# Patient Record
Sex: MALE | URBAN - METROPOLITAN AREA
[De-identification: names, ages, dates, MRNs, and addresses within clinical notes are randomized per-mention and may not be internally consistent; named-entity substitution may affect disease eponyms.]

---

## 2024-02-08 ENCOUNTER — NON-PROVIDER VISIT (OUTPATIENT)
Dept: URGENT CARE | Facility: CLINIC | Age: 29
End: 2024-02-08

## 2024-02-08 DIAGNOSIS — Z02.1 PRE-EMPLOYMENT DRUG SCREENING: ICD-10-CM

## 2024-02-08 LAB
AMP AMPHETAMINE: NORMAL
COC COCAINE: NORMAL
INT CON NEG: NORMAL
INT CON POS: NORMAL
MET METHAMPHETAMINES: NORMAL
OPI OPIATES: NORMAL
PCP PHENCYCLIDINE: NORMAL
POC DRUG COMMENT 753798-OCCUPATIONAL HEALTH: NEGATIVE
THC: NORMAL

## 2024-02-08 PROCEDURE — 80305 DRUG TEST PRSMV DIR OPT OBS: CPT | Performed by: NURSE PRACTITIONER

## 2024-04-20 ENCOUNTER — APPOINTMENT (OUTPATIENT)
Dept: URGENT CARE | Facility: CLINIC | Age: 29
End: 2024-04-20
Payer: COMMERCIAL

## 2024-04-21 ENCOUNTER — OCCUPATIONAL MEDICINE (OUTPATIENT)
Dept: URGENT CARE | Facility: CLINIC | Age: 29
End: 2024-04-21
Payer: COMMERCIAL

## 2024-04-21 ENCOUNTER — APPOINTMENT (OUTPATIENT)
Dept: RADIOLOGY | Facility: IMAGING CENTER | Age: 29
End: 2024-04-21
Payer: COMMERCIAL

## 2024-04-21 ENCOUNTER — NON-PROVIDER VISIT (OUTPATIENT)
Dept: URGENT CARE | Facility: CLINIC | Age: 29
End: 2024-04-21
Payer: COMMERCIAL

## 2024-04-21 VITALS
RESPIRATION RATE: 20 BRPM | HEIGHT: 71 IN | WEIGHT: 195 LBS | DIASTOLIC BLOOD PRESSURE: 82 MMHG | HEART RATE: 71 BPM | OXYGEN SATURATION: 99 % | SYSTOLIC BLOOD PRESSURE: 118 MMHG | TEMPERATURE: 97.2 F | BODY MASS INDEX: 27.3 KG/M2

## 2024-04-21 VITALS
BODY MASS INDEX: 27.3 KG/M2 | DIASTOLIC BLOOD PRESSURE: 82 MMHG | OXYGEN SATURATION: 99 % | SYSTOLIC BLOOD PRESSURE: 118 MMHG | WEIGHT: 195 LBS | TEMPERATURE: 97.6 F | RESPIRATION RATE: 20 BRPM | HEIGHT: 71 IN | HEART RATE: 71 BPM
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
## 2024-04-21 DIAGNOSIS — M25.531 RIGHT WRIST PAIN: ICD-10-CM

## 2024-04-21 DIAGNOSIS — Z02.1 PRE-EMPLOYMENT DRUG SCREENING: ICD-10-CM

## 2024-04-21 LAB
AMP AMPHETAMINE: NORMAL
BREATH ALCOHOL COMMENT: NORMAL
COC COCAINE: NORMAL
INT CON NEG: NORMAL
INT CON POS: NORMAL
MET METHAMPHETAMINES: NORMAL
OPI OPIATES: NORMAL
PCP PHENCYCLIDINE: NORMAL
POC BREATHALIZER: 0 PERCENT (ref 0–0.01)
POC DRUG COMMENT 753798-OCCUPATIONAL HEALTH: NEGATIVE
THC: NORMAL

## 2024-04-21 PROCEDURE — 82075 ASSAY OF BREATH ETHANOL: CPT

## 2024-04-21 PROCEDURE — 3079F DIAST BP 80-89 MM HG: CPT

## 2024-04-21 PROCEDURE — 80305 DRUG TEST PRSMV DIR OPT OBS: CPT

## 2024-04-21 PROCEDURE — 3074F SYST BP LT 130 MM HG: CPT

## 2024-04-21 PROCEDURE — 99203 OFFICE O/P NEW LOW 30 MIN: CPT

## 2024-04-21 PROCEDURE — 73110 X-RAY EXAM OF WRIST: CPT | Mod: TC,RT | Performed by: RADIOLOGY

## 2024-04-21 NOTE — LETTER
"    EMPLOYEE’S CLAIM FOR COMPENSATION/ REPORT OF INITIAL TREATMENT  FORM C-4  PLEASE TYPE OR PRINT    EMPLOYEE’S CLAIM - PROVIDE ALL INFORMATION REQUESTED   First Name                    QUAN Sage Last Name  Kirt Birthdate                    1995                Sex  []M  []F Claim Number (Insurer’s Use Only)     Home Address  [address] Age  28 y.o. Height  1.803 m (5' 11\") Weight  88.5 kg (195 lb) Social Security Number     [facility] Zip  [address] Telephone  [phone] (home)    Mailing Address  [address] [facility] Zip  [address] Primary Language Spoken  English    INSURER   THIRD-PARTY   [employer]   Employee's Occupation (Job Title) When Injury or Occupational Disease Occurred      Employer's Name/Company Name  [employer]  Telephone  [phone]    Office Mail Address (Number and Street)  [address]     Date of Injury (if applicable) 4/20/2024               Hours Injury (if applicable)  3:09 PM Date Employer Notified  4/20/2024 Last Day of Work after Injury or Occupational Disease  4/20/2024 Supervisor to Whom Injury     Reported  Jemima Castrejon   Address or Location of Accident (if applicable)  Work [1]   What were you doing at the time of accident? (if applicable)  opening a side door    How did this injury or occupational disease occur? (Be specific and answer in detail. Use additional sheet if necessary)  While opening a door I felt a pop & my right wrist is now no good   If you believe that you have an occupational disease, when did you first have knowledge of the disability and its relationship to your employment?  NA Witnesses to the Accident (if applicable)  None      Nature of Injury or Occupational Disease  Sprain  Part(s) of Body Injured or Affected  Wrist (R) and Hand (R)      I CERTIFY THAT THE ABOVE " IS TRUE AND CORRECT TO T HE BEST OF MY KNOWLEDGE AND THAT I HAVE PROVIDED THIS INFORMATION IN ORDER TO OBTAIN THE BENEFITS OF [place], OR [place]).  I HEREBY AUTHORIZE ANY PHYSICIAN, CHIROPRACTOR, SURGEON, PRACTITIONER OR ANY OTHER PERSON, ANY HOSPITAL, INCLUDING [facility] OR [facility], ANY  MEDICAL SERVICE ORGANIZATION, ANY INSURANCE COMPANY, OR OTHER INSTITUTION OR ORGANIZATION TO RELEASE TO EACH OTHER, ANY MEDICAL OR OTHER INFORMATION, INCLUDING BENEFITS PAID OR PAYABLE, PERTINENT TO THIS INJURY OR DISEASE, EXCEPT INFORMATION RELATIVE TO DIAGNOSIS, TREATMENT AND/OR COUNSELING FOR AIDS, PSYCHOLOGICAL CONDITIONS, ALCOHOL OR CONTROLLED SUBSTANCES, FOR WHICH I MUST GIVE SPECIFIC AUTHORIZATION.  A PHOTOSTAT OF THIS AUTHORIZATION SHALL BE VALID AS THE ORIGINAL.     Date 04.21.24   [facility] UR Employee’s Original or  *Electronic Signature   THIS REPORT MUST BE COMPLETED AND MAILED WITHIN 3 WORKING DAYS OF TREATMENT   [facility]    Name of Facility  [facility]   Date 4/21/2024 Diagnosis and Description of Injury or Occupational Disease  (M25.531) Right wrist pain  (Y99.0) Work related injury  Diagnoses of Right wrist pain and Work related injury were pertinent to this visit. Is there evidence that the injured employee was under the influence of alcohol and/or another controlled substance at the time of accident?  []No  [] Yes (if yes, please explain)   Hour 8:46 AM  No   Treatment: Work restrictions.  No use of right hand.  Velcro wrist splint while at work.  Ibuprofen for pain. Follow up in 1 week    Have you advised the patient to remain off work five days or more?   [] Yes Indicate dates: From   To    []No If no, is the injured employee capable of: [] full duty [] modified duty                                                             No  Yes  If modified duty, specify any  limitations / restrictions:  See d-39                                                                                                                                                                                                                                                                                                                                                                                                               X-Ray Findings: Negative    From information given by the employee, together with medical evidence, can you directly connect this injury or occupational disease as job incurred?  []Yes   [] No Yes    Is additional medical care by a physician indicated? []Yes [] No  Yes    Do you know of any previous injury or disease contributing to this condition or occupational disease? []Yes [] No (Explain if yes)                          No   Date  4/21/2024 Print Health Care Provider’s Name  AWILDA Guzman I certify that the employer’s copy of  this form was delivered to the employer on:   Address  [address] INSURER'S USE ONLY                       [place] Zip  [address] Provider’s Tax ID Number  [id number]   Telephone  Dept: [phone]    Health Care Provider’s Original or Electronic Signature  e-JACQUI Hernandez Degree (MD,DO, DC,PA-C,APRN)  APRN  Choose (if applicable)      ORIGINAL - TREATING HEALTHCARE PROVIDER PAGE 2 - INSURER/TPA PAGE 3 - EMPLOYER PAGE 4 - EMPLOYEE             Form C-4 (rev.08/23)     BRIEF DESCRIPTION OF RIGHTS AND BENEFITS  (Pursuant to [place] 616[place].050)    Notice of Injury or Occupational Disease (Incident Report Form C-1): If an injury or occupational disease (OD) arises out of and in the  course of employment, you must provide written notice to your employer as soon as practicable, but no later than 7 days after the accident or  OD. Your employer shall maintain a sufficient supply of the required forms.    Employee’s Claim for  Compensation/Report of Initial Treatment (Form C-4): If medical treatment is sought, the Form C-4 is available at  the place of initial treatment. A completed Form C-4 must be filed within 90 days after an accident or OD. The treating physician, chiropractic  physician, physician assistant or advanced practice nurse must, within 3 working days after treatment, complete and mail to the employer, the  employer's insurer and third-party , the Claim for Compensation.    Medical Treatment: If you require medical treatment for your on-the-job injury or OD, you may be required to select a physician or  chiropractic physician from a list provided by your workers’ compensation insurer, if it has contracted with an Organization for Managed Care  (MCO) or Preferred Provider Organization (PPO) or providers of health care. If your employer has not entered into a contract with an MCO or  PPO, you may select a physician or chiropractic physician from the Panel of Physicians and Chiropractic Physicians. Any medical costs related  to your industrial injury or OD will be paid by your insurer.    Temporary Total Disability (TTD): If your doctor has certified that you are unable to work for a period of at least 5 consecutive days, or 5  cumulative days in a 20-day period, or places restrictions on you that your employer does not accommodate, you may be entitled to TTD  compensation.    Temporary Partial Disability (TPD): If the wage you receive upon reemployment is less than the compensation for TTD to which you are  entitled, the insurer may be required to pay you TPD compensation to make up the difference. TPD can only be paid for a maximum of 24  months.    Permanent Partial Disability (PPD): When your medical condition is stable and there is an indication of a PPD as a result of your injury or  OD, within 30 days, your insurer must arrange for an evaluation by a rating physician or chiropractic physician to determine  the degree of your  PPD. The amount of your PPD award depends on the date of injury, the results of the PPD evaluation, your age and wage.    Permanent Total Disability (PTD): If you are medically certified by a treating physician or chiropractic physician as permanently and totally  disabled and have been granted a PTD status by your insurer, you are entitled to receive monthly benefits not to exceed 66 2/3% of your  average monthly wage. The amount of your PTD payments is subject to reduction if you previously received a lump-sum PPD award.    Vocational Rehabilitation Services: You may be eligible for vocational rehabilitation services if you are unable to return to the job due to a  permanent physical impairment or permanent restrictions as a result of your injury or occupational disease.    Transportation and Per Inocencio Reimbursement: You may be eligible for travel expenses and per inocencio associated with medical treatment.    Reopening: You may be able to reopen your claim if your condition worsens after claim closure.    Appeal Process: If you disagree with a written determination issued by the insurer or the insurer does not respond to your request, you may  appeal to the Department of Administration, , by following the instructions contained in your determination letter. You must  appeal the determination within 70 days from the date of the determination letter at [address], [address], [place], [place]  [address], or [address], [place] [address]. If you disagree with the  decision, you may appeal to the  Department of Administration, . You must file your appeal within 30 days from the date of the  decision letter  at [address], [address], [place], [place] [address], or [address], [place] [address]. If you  disagree with a decision of an , you may file a  petition for judicial review with the District Court. You must do so within 30  days of the ’s decision. You may be represented by an  at your own expense, or you may contact the [facility] for possible  Representation.    [place]  for Injured Workers (NAIW): If you disagree with a  decision, you may request that NAIW represent you  without charge at an  Hearing. For information regarding denial of benefits, you may contact the [facility] at: [address], [address], [place] [address], [phone], or [address], [address], [place] [address], [phone]    To File a Complaint with the Division: If you wish to file a complaint with the  of the Division of Industrial Relations (DIR),  please contact the Workers’ Compensation Section, [address] [place] [address] [address], [place] [address], telephone [phone], or  [address] [facility] [facility] [address] [place], [place] [address], telephone [phone].    For AssistancewithWorkers’Compensation Issues: You may contact the [facility] Office for Consumer Health Assistance, [address] [place] [address], Toll Free 1-707.147.5446, Web site:  https://adsd.nv.[facility]/Programs/OVI/Office_for_Consumer_Health_Assistance_(OCHA)/ E-mail: ovi@govcha.nv.[facility]              __________________________________________________________________                                    _________________            Employee Name / Signature                                                                                                                            Date                                                                                                                                                                                                                              D-2 (rev. 02/24)

## 2024-04-21 NOTE — LETTER
PHYSICIAN’S AND CHIROPRACTIC PHYSICIAN'S                       PROGRESS REPORT  CERTIFICATION OF DISABILITY Claim Number:        Social Security Number:     Patient’s Name:Chu Moralez Date of Injury:  4/20/2024     Employer:  [employer] *** Name of O (if applicable)   Patient’s Job Description/Occupation:   ***   Previous Injuries/Diseases/Surgeries Contributing to the Condition:      Diagnosis:  (M25.531) Right wrist pain  (Y99.0) Work related injuryDiagnoses of Right wrist pain and Work related injury were pertinent to this visit.   Related to the Industrial Injury? No   Explain:[unfilled]   Objective Medical Findings: Gen: no acute distress, normal voice  Skin: dry, intact, moist mucosal membranes  Head: Atraumatic, normocephalic  Psych: normal affect, normal judgement, alert, awake  Musculoskeletal: Pain is localized to medial wrist, ulnar side.  No real pain with palpation.  Pain is with adduction and abduction and rotation.  No ecchymosis or swelling noted.  As a side note pt's hands are cold and alyce appearing with delayed cap refill.  This is bilateral and has been ongoing for quite some time.  He may have an undiagnosed raynaud's type disorder.  Advised to FU with his PCP       []  None - Discharged                         Stable     []  Yes     []  No                           Ratable     []  Yes     []  No   []  Generally Improved                        []  Condition Worsened                              []  Condition Same         May Have Suffered a Permanent Disability     []  Yes     []  No   Treatment Plan: [unfilled]     [] No Change in Therapy                    [] PT/OT Prescribed                   [] Medication May be Used While Working    [] Case Management                          [] PT/OT Discontinued  []  Consultation    [] Further Diagnostic Studies    []  Prescription(s)                        [] Released to  FULL DUTY /No Restrictions on (Date):                                                                                           [] Certified TOTALLY TEMPORARILY DISABLED (Indicate Dates): From:                       To:                               [x] Released to RESTRICTED/Modified Duty on (Date): From:     4/21-4/28        To:                                                                   Restrictions Are:     []  Permanent[]  Temporary   [] No Sitting                   []  No Standing          []  No Pulling             []  Other:                                              [] No Bending at Waist  []  No Stooping          []  No Lifting                                                                            [] No Carrying                []  No Walking           []  Lifting Restricted to (lbs.):   [] No Pushing                 []  No Climbing         []  No Reaching Above Shoulders   Date of Next Visit: 4/28/2024 Date of this Exam:  4/21/2024 Physician/Chiropractic Physician Name: AWILDA Guzman Physician/Chiropractic Physician Signature:   Kevin Cano DO MPH   D-39 (Rev. 2/24)

## 2024-04-21 NOTE — PROGRESS NOTES
"Subjective:     Chu Moralez is a 28 y.o. male who presents for Wrist Injury (Right at work )      DOI: 4/20  Pt was at work yesterday as a  and was opening a sliding glass door with his right hand and felt a pop on the ulnar side of his wrist and now has limited mobility.  He has never injured his wrist before. He is right hand dominant.  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 Painful when moving side to side and with rotation, not so much with flexion/extension    PMH:   No pertinent past medical history to this problem  MEDS:  Medications were reviewed in EMR  ALLERGIES:  Allergies were reviewed in EMR  FH:   No pertinent family history to this problem       Objective:     /82   Pulse 71   Temp 36.4 °C (97.6 °F) (Temporal)   Resp 20   Ht 1.803 m (5' 11\")   Wt 88.5 kg (195 lb)   SpO2 99%   BMI 27.20 kg/m²     Gen: no acute distress, normal voice  Skin: dry, intact, moist mucosal membranes  Head: Atraumatic, normocephalic  Psych: normal affect, normal judgement, alert, awake  Musculoskeletal: Pain is localized to medial wrist, ulnar side.  No real pain with palpation.  Pain is with adduction and abduction and rotation.  No ecchymosis or swelling noted.  As a side note pt's hands are cold and alyce appearing with delayed cap refill.  This is bilateral and has been ongoing for quite some time.  He may have an undiagnosed raynaud's type disorder.  Advised to FU with his PCP       Assessment/Plan:       1. Right wrist pain  - DX-WRIST-COMPLETE 3+ RIGHT; Future    2. Work related injury  - DX-WRIST-COMPLETE 3+ RIGHT; Future    Released to Restricted Duty FROM 4/21/2024 TO 4/28/2024  Work restrictions.  No use of right hand.  Velcro wrist splint while at work.  Ibuprofen for pain. Follow up in 1 week       Differential diagnosis, natural history, supportive care, and indications for immediate follow-up discussed.    "

## 2024-04-21 NOTE — LETTER
PHYSICIAN’S AND CHIROPRACTIC PHYSICIAN'S                       PROGRESS REPORT  CERTIFICATION OF DISABILITY Claim Number:        Social Security Number:     Patient’s Name:Chu Moralez Date of Injury:  4/20/2024     Employer:  [employer]  Name of O (if applicable)   Patient’s Job Description/Occupation:      Previous Injuries/Diseases/Surgeries Contributing to the Condition:      Diagnosis:  (M25.531) Right wrist pain  (Y99.0) Work related injuryDiagnoses of Right wrist pain and Work related injury were pertinent to this visit.   Related to the Industrial Injury? No   Explain:[unfilled]   Objective Medical Findings: Gen: no acute distress, normal voice  Skin: dry, intact, moist mucosal membranes  Head: Atraumatic, normocephalic  Psych: normal affect, normal judgement, alert, awake  Musculoskeletal: Pain is localized to medial wrist, ulnar side.  No real pain with palpation.  Pain is with adduction and abduction and rotation.  No ecchymosis or swelling noted.  As a side note pt's hands are cold and alyce appearing with delayed cap refill.  This is bilateral and has been ongoing for quite some time.  He may have an undiagnosed raynaud's type disorder.  Advised to FU with his PCP       []  None - Discharged                         Stable     []  Yes     []  No                           Ratable     []  Yes     []  No   []  Generally Improved                        []  Condition Worsened                              []  Condition Same         May Have Suffered a Permanent Disability     []  Yes     []  No   Treatment Plan: Work restrictions.  No use of right hand.  Velcro wrist splint while at work.  Ibuprofen for pain. Follow up in 1 week     [] No Change in Therapy                    [] PT/OT Prescribed                   [] Medication May be Used While Working    [] Case Management                          [] PT/OT Discontinued  []   Consultation    [] Further Diagnostic Studies    []  Prescription(s)                        [] Released to FULL DUTY /No Restrictions on (Date):                                                                                           [] Certified TOTALLY TEMPORARILY DISABLED (Indicate Dates): From: 4/24-4/28                To:                               [x] Released to RESTRICTED/Modified Duty on (Date): From:                              To:                                                                   Restrictions Are:     []  Permanent[]  Temporary   [] No Sitting                   []  No Standing          []  No Pulling             []  Other:                                              [] No Bending at Waist  []  No Stooping          []  No Lifting                                                                            [] No Carrying                []  No Walking           []  Lifting Restricted to (lbs.):   [] No Pushing                 []  No Climbing         []  No Reaching Above Shoulders   Date of Next Visit: 4/28/2024 Date of this Exam:  4/21/2024 Physician/Chiropractic Physician Name: AWILDA Guzman Physician/Chiropractic Physician Signature:   Kevin Cano DO MPH   D-39 (Rev. 2/24)

## 2024-04-28 ENCOUNTER — OFFICE VISIT (OUTPATIENT)
Dept: URGENT CARE | Facility: CLINIC | Age: 29
End: 2024-04-28
Payer: COMMERCIAL

## 2024-04-28 VITALS
DIASTOLIC BLOOD PRESSURE: 90 MMHG | BODY MASS INDEX: 27.3 KG/M2 | WEIGHT: 195 LBS | RESPIRATION RATE: 16 BRPM | SYSTOLIC BLOOD PRESSURE: 142 MMHG | TEMPERATURE: 96.8 F | HEART RATE: 96 BPM | HEIGHT: 71 IN | OXYGEN SATURATION: 98 %

## 2024-04-28 DIAGNOSIS — M25.531 RIGHT WRIST PAIN: ICD-10-CM

## 2024-04-28 DIAGNOSIS — S63.501A SPRAIN AND STRAIN OF RIGHT WRIST: ICD-10-CM

## 2024-04-28 DIAGNOSIS — S66.911A SPRAIN AND STRAIN OF RIGHT WRIST: ICD-10-CM

## 2024-04-28 ASSESSMENT — VISUAL ACUITY: OU: 1

## 2024-04-28 ASSESSMENT — ENCOUNTER SYMPTOMS
CONSTITUTIONAL NEGATIVE: 1
NEUROLOGICAL NEGATIVE: 1

## 2024-04-28 NOTE — PROGRESS NOTES
"Subjective:     Chu Moralez is a 28 y.o. male who presents for Wrist Injury (WC f.u )    Initial UC visit 4/21/2024 reviewed for continuity of care:    \"DOI: 4/20 Pt was at work yesterday as a  and was opening a sliding glass door with his right hand and felt a pop on the ulnar side of his wrist and now has limited mobility. He has never injured his wrist before. He is right hand dominant. 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 Painful when moving side to side and with rotation, not so much with flexion/extension \"    X-ray of R wrist negative for acute findings. Tx: restrictions, wrist splint, ibuprofen, plan follow up for 1 week.    Follow-up UC visit today 4/28/2024:    Patient reporting 70% improvement in the symptoms.  Continues to have pain at the ulnar aspect of his right wrist as before.  No other/new symptoms.  Has been out of work.  Using ibuprofen as needed.  Wearing wrist brace.    Review of Systems   Constitutional: Negative.    Musculoskeletal:         R wrist pain   Neurological: Negative.    All other systems reviewed and are negative.    Refer to HPI for additional details.    During this visit, appropriate PPE was worn and hand hygiene was performed.    PMH: No pertinent past medical history to this problem  MEDS: Medications were reviewed in Epic  ALLERGIES: Allergies were reviewed in Epic  SOCHX: Works as a  at [employer]   FH: No pertinent family history to this problem      Objective:     BP (!) 142/90   Pulse 96   Temp 36 °C (96.8 °F) (Temporal)   Resp 16   Ht 1.803 m (5' 11\")   Wt 88.5 kg (195 lb)   SpO2 98%   BMI 27.20 kg/m²     Physical Exam  Nursing note reviewed.   Constitutional:       General: He is not in acute distress.     Appearance: He is well-developed. He is not ill-appearing or toxic-appearing.   Eyes:      General: Vision grossly intact.   Cardiovascular:      Rate and Rhythm: Normal rate.      Pulses: Normal pulses. "   Pulmonary:      Effort: Pulmonary effort is normal. No respiratory distress.   Musculoskeletal:         General: No deformity. Normal range of motion.      Right forearm: No swelling, deformity or tenderness.      Right wrist: No swelling, deformity, lacerations or tenderness. Normal range of motion.      Right hand: No swelling, deformity or tenderness. Normal range of motion. Normal strength. Normal sensation.      Comments: Right wrist: no TTP, however, patient does report pain at the ulnar aspect with making a fist; negative Tinel and Phalen   Skin:     General: Skin is warm and dry.      Capillary Refill: Capillary refill takes 2 to 3 seconds.      Coloration: Skin is not pale.   Neurological:      Mental Status: He is alert and oriented to person, place, and time.      Motor: No weakness.   Psychiatric:         Behavior: Behavior normal. Behavior is cooperative.       Assessment/Plan:     1. Right wrist pain    2. Sprain and strain of right wrist    Condition improving overall.  Expect to continue to improve with time.  Continue previously recommended therapy including ibuprofen, brace, and restrictions.  Follow-up in 1 week.  Monitor. Seek immediate medical attention if symptoms change/worsen.     Differential diagnosis, natural history, supportive care, over-the-counter symptom management per 's instructions, close monitoring, and indications for immediate follow-up discussed.     All questions answered. Patient agrees with the plan of care.

## 2024-04-28 NOTE — LETTER
"                  PHYSICIAN’S AND CHIROPRACTIC PHYSICIAN'S                       PROGRESS REPORT  CERTIFICATION OF DISABILITY Claim Number:        Social Security Number:     Patient’s Name:hCu Moralez Date of Injury:  4/20/2024     Employer:  [employer]  Name of O (if applicable)   Patient’s Job Description/Occupation:     Previous Injuries/Diseases/Surgeries Contributing to the Condition:      Diagnosis:  (M25.531) Right wrist pain  (S63.501A,  S66.911A) Sprain and strain of right wristDiagnoses of Right wrist pain and Sprain and strain of right wrist were pertinent to this visit.   Related to the Industrial Injury? Yes   Explain:[unfilled]    Initial UC visit 4/21/2024 reviewed for continuity of care:    \"DOI: 4/20 Pt was at work yesterday as a  and was opening a sliding glass door with his right hand and felt a pop on the ulnar side of his wrist and now has limited mobility. He has never injured his wrist before. He is right hand dominant. 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 Painful when moving side to side and with rotation, not so much with flexion/extension \"    X-ray of R wrist negative for acute findings. Tx: restrictions, wrist splint, ibuprofen, plan follow up for 1 week.    Follow-up  visit today 4/28/2024:    Patient reporting 70% improvement in the symptoms.  Continues to have pain at the ulnar aspect of his right wrist as before.  No other/new symptoms.  Has been out of work.  Using ibuprofen as needed.  Wearing wrist brace.   Objective Medical Findings: Constitutional:       General: He is not in acute distress.     Appearance: He is well-developed. He is not ill-appearing or toxic-appearing.   Musculoskeletal:         General: No deformity. Normal range of motion.      Right forearm: No swelling, deformity or tenderness.      Right wrist: No swelling, deformity, lacerations or tenderness. Normal range of " motion.      Right hand: No swelling, deformity or tenderness. Normal range of motion. Normal strength. Normal sensation.      Comments: Right wrist: no TTP, however, patient does report pain at the ulnar aspect with making a fist; negative Tinel and Phalen   Skin:     General: Skin is warm and dry.      Capillary Refill: Capillary refill takes 2 to 3 seconds.      Coloration: Skin is not pale.   Neurological:      Mental Status: He is alert and oriented to person, place, and time.      Motor: No weakness.    []  None - Discharged                         Stable     [x]  Yes     []  No                           Ratable     []  Yes     []  No   [x]  Generally Improved                        []  Condition Worsened                              []  Condition Same         May Have Suffered a Permanent Disability     []  Yes     []  No   Treatment Plan: [unfilled]    Condition improving overall.  Expect to continue to improve with time.  Continue previously recommended therapy including ibuprofen, brace, and restrictions.  Follow-up in 1 week.  Monitor. Seek immediate medical attention if symptoms change/worsen.     [x] No Change in Therapy                    [] PT/OT Prescribed                   [] Medication May be Used While Working    [] Case Management                          [] PT/OT Discontinued  []  Consultation    [] Further Diagnostic Studies    []  Prescription(s)                        [] Released to FULL DUTY /No Restrictions on (Date):                                                                                           [] Certified TOTALLY TEMPORARILY DISABLED (Indicate Dates): From:                       To:                               [x] Released to RESTRICTED/Modified Duty on (Date): From:                              To:                                                                   Restrictions Are:     []  Permanent[x]  Temporary    Status: Released to Restricted Duty  From:  4/28/24 Through: 5/5/24 Restrictions are: Temporary  Comments: No use of right hand.  Must wear right wrist brace.     [] No Sitting                   []  No Standing          []  No Pulling             []  Other:                                              [] No Bending at Waist  []  No Stooping          []  No Lifting                                                                            [] No Carrying                []  No Walking           []  Lifting Restricted to (lbs.):   [] No Pushing                 []  No Climbing         []  No Reaching Above Shoulders   Date of Next Visit: 5/5/2024  @ 8 AM  Date of this Exam:  4/28/2024 Physician/Chiropractic Physician Name: AWILDA Mota Physician/Chiropractic Physician Signature:   Kevin Cano DO MPH   D-39 (Rev. 2/24)

## 2024-05-05 ENCOUNTER — OCCUPATIONAL MEDICINE (OUTPATIENT)
Dept: URGENT CARE | Facility: CLINIC | Age: 29
End: 2024-05-05
Payer: COMMERCIAL

## 2024-05-05 VITALS
HEIGHT: 71 IN | DIASTOLIC BLOOD PRESSURE: 74 MMHG | TEMPERATURE: 97.3 F | RESPIRATION RATE: 15 BRPM | BODY MASS INDEX: 28.28 KG/M2 | SYSTOLIC BLOOD PRESSURE: 116 MMHG | WEIGHT: 202 LBS | HEART RATE: 84 BPM | OXYGEN SATURATION: 97 %

## 2024-05-05 DIAGNOSIS — S63.501A SPRAIN AND STRAIN OF RIGHT WRIST: ICD-10-CM

## 2024-05-05 DIAGNOSIS — S66.911A SPRAIN AND STRAIN OF RIGHT WRIST: ICD-10-CM

## 2024-05-05 PROCEDURE — 3074F SYST BP LT 130 MM HG: CPT

## 2024-05-05 PROCEDURE — 99213 OFFICE O/P EST LOW 20 MIN: CPT

## 2024-05-05 PROCEDURE — 3078F DIAST BP <80 MM HG: CPT

## 2024-05-05 NOTE — LETTER
PHYSICIAN’S AND CHIROPRACTIC PHYSICIAN'S                       PROGRESS REPORT  CERTIFICATION OF DISABILITY Claim Number:        Social Security Number:     Patient’s Name:Chu Moralez Date of Injury:  4/20/2024     Employer:  [employer]  Name of O (if applicable)   Patient’s Job Description/Occupation:      Previous Injuries/Diseases/Surgeries Contributing to the Condition:  N/A   Diagnosis:  No diagnosis found.There were no encounter diagnoses.   Related to the Industrial Injury? No      Objective Medical Findings: Pain present to ulnar asepct of right wrist with ulnar and radial deviation.  Neuro intact.  Cap refills brisk.  Pulses intact.    []  None - Discharged                         Stable     []  Yes     []  No                           Ratable     []  Yes     []  No   []  Generally Improved                        []  Condition Worsened                              [x]  Condition Same         May Have Suffered a Permanent Disability     []  Yes     []  No   Treatment Plan: [unfilled]     [] No Change in Therapy                    [] PT/OT Prescribed                   [] Medication May be Used While Working    [] Case Management                          [] PT/OT Discontinued  []  Consultation    [] Further Diagnostic Studies    []  Prescription(s)                        [] Released to FULL DUTY /No Restrictions on (Date):                                                                                           [] Certified TOTALLY TEMPORARILY DISABLED (Indicate Dates): From:                       To:                               [x] Released to RESTRICTED/Modified Duty on (Date): From:                  5/5/24            To:     5/12/24                                                              Restrictions Are:     []  Permanent[x]  Temporary   [] No Sitting                   []  No Standing          []  No Pulling             []  Other: Abstain  from use of right hand                                            [] No Bending at Waist  []  No Stooping          []  No Lifting                                                                            [] No Carrying                []  No Walking           []  Lifting Restricted to (lbs.):< or = to 10 pounds  [] No Pushing                 []  No Climbing         []  No Reaching Above Shoulders   Date of Next Visit: 5/12/2024 Date of this Exam:  5/5/2024 Physician/Chiropractic Physician Name: JASMINA Burdick.[email or web address]. Physician/Chiropractic Physician Signature:   Kevin Cano DO MPH   D-39 (Rev. 2/24)

## 2024-05-05 NOTE — PROGRESS NOTES
"Subjective:     Chu Moralez is a 28 y.o. male who presents for Follow-Up ( [facility]/ 04.20.2024/ Wrist (R). Patient states no improvement since last week. )      DOI: 4/20  Pt was at work yesterday as a  and was opening a sliding glass door with his right hand and felt a pop on the ulnar side of his wrist and now has limited mobility.  He has never injured his wrist before. He is right hand dominant.  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 Painful when moving side to side and with rotation, not so much with flexion/extension.   F/U #2 (4/28/24)- Patient reporting 70% improvement in the symptoms.  Continues to have pain at the ulnar aspect of his right wrist as before.  No other/new symptoms.  Has been out of work.  Using ibuprofen as needed.  Wearing wrist brace.  F/U #3 (5/5/24)- Reports no improvement in pain. Patient states pain when using his hand to drive or lift object. Denies numbness and tingling. He reports use of brace and motrin as needed.       PMH:   No pertinent past medical history to this problem  MEDS:  Medications were reviewed in EMR  ALLERGIES:  Allergies were reviewed in EMR  SOCHX:  Works as a   FH:   No pertinent family history to this problem       Objective:     /74   Pulse 84   Temp 36.3 °C (97.3 °F) (Temporal)   Resp 15   Ht 1.803 m (5' 11\")   Wt 91.6 kg (202 lb)   SpO2 97%   BMI 28.17 kg/m²     Pain present to ulnar asepct of right wrist with ulnar and radial deviation.  Neuro intact.  Cap refills brisk.  Pulses intact.    Assessment/Plan:       There are no diagnoses linked to this encounter.  Released to Restricted Duty FROM   TO 5/12/2024  Must abstain from using right hand. No pushing, pulling or lifting anything greater than 10 lbs. continue with wrist brace for support and comfort.  Tylenol and Motrin as needed.  May try Voltaren gel to site.  Follow-up in 1 week or sooner if symptoms worsen.      Differential diagnosis, natural " history, supportive care, and indications for immediate follow-up discussed.

## 2024-05-10 ENCOUNTER — APPOINTMENT (OUTPATIENT)
Dept: URGENT CARE | Facility: CLINIC | Age: 29
End: 2024-05-10
Payer: COMMERCIAL

## 2024-05-10 ENCOUNTER — OCCUPATIONAL MEDICINE (OUTPATIENT)
Dept: URGENT CARE | Facility: CLINIC | Age: 29
End: 2024-05-10
Payer: COMMERCIAL

## 2024-05-10 VITALS
SYSTOLIC BLOOD PRESSURE: 112 MMHG | WEIGHT: 200.1 LBS | HEIGHT: 71 IN | RESPIRATION RATE: 12 BRPM | TEMPERATURE: 97.4 F | BODY MASS INDEX: 28.01 KG/M2 | DIASTOLIC BLOOD PRESSURE: 66 MMHG | OXYGEN SATURATION: 98 % | HEART RATE: 84 BPM

## 2024-05-10 DIAGNOSIS — S66.911A SPRAIN AND STRAIN OF RIGHT WRIST: ICD-10-CM

## 2024-05-10 DIAGNOSIS — S63.501A SPRAIN AND STRAIN OF RIGHT WRIST: ICD-10-CM

## 2024-05-10 PROCEDURE — 3078F DIAST BP <80 MM HG: CPT | Performed by: PHYSICIAN ASSISTANT

## 2024-05-10 PROCEDURE — 99213 OFFICE O/P EST LOW 20 MIN: CPT | Performed by: PHYSICIAN ASSISTANT

## 2024-05-10 PROCEDURE — 3074F SYST BP LT 130 MM HG: CPT | Performed by: PHYSICIAN ASSISTANT

## 2024-05-10 NOTE — PROGRESS NOTES
"Subjective     Chu Moralez is a 28 y.o. male who presents with Follow-Up (Workers comp )          HPI  Patient presents today for follow-up of work-related injury regarding right wrist sprain.  Patient states he has not been able to go back to work as they do not have any light duty but he states he is continue to wear the wrist splint and has noticed improvement to about 80% now.    Review of Systems   Musculoskeletal:         Right wrist pain improving              Objective     /66 (BP Location: Left arm, Patient Position: Sitting)   Pulse 84   Temp 36.3 °C (97.4 °F) (Temporal)   Resp 12   Ht 1.803 m (5' 11\")   Wt 90.8 kg (200 lb 1.6 oz)   SpO2 98%   BMI 27.91 kg/m²      Physical Exam  Vitals and nursing note reviewed.   Constitutional:       General: He is not in acute distress.     Appearance: Normal appearance. He is well-developed. He is not ill-appearing or toxic-appearing.   HENT:      Head: Normocephalic and atraumatic.      Right Ear: Hearing normal.      Left Ear: Hearing normal.   Cardiovascular:      Rate and Rhythm: Normal rate.   Pulmonary:      Effort: Pulmonary effort is normal.   Musculoskeletal:      Comments: Patient has 5 of 5  strength of the right wrist.  No bony tenderness to palpation.  Patient does have some discomfort to the distal ulna with lateral movement.  Neurovascular intact distally.  Less than 2 capillary fill.  No swelling or deformity noted.   Skin:     General: Skin is warm and dry.   Neurological:      Mental Status: He is alert.      Coordination: Coordination normal.   Psychiatric:         Mood and Affect: Mood normal.                      Assessment & Plan   1. Sprain and strain of right wrist    Patient will continue with restrictions and wearing wrist splint at all times.  Recommend ibuprofen per 's instructions if needed.  Would suggest following up and possibly trialing full duty at next visit if symptoms continue to improve.     Please " note that this dictation was created using voice recognition software. I have made every reasonable attempt to correct obvious errors, but I expect that there may be errors of grammar and possibly content that I did not discover before finalizing the note.

## 2024-05-19 ENCOUNTER — APPOINTMENT (OUTPATIENT)
Dept: URGENT CARE | Facility: CLINIC | Age: 29
End: 2024-05-19
Payer: COMMERCIAL

## 2024-05-20 ENCOUNTER — OCCUPATIONAL MEDICINE (OUTPATIENT)
Dept: URGENT CARE | Facility: CLINIC | Age: 29
End: 2024-05-20
Payer: COMMERCIAL

## 2024-05-20 VITALS
HEIGHT: 71 IN | TEMPERATURE: 96.8 F | SYSTOLIC BLOOD PRESSURE: 122 MMHG | BODY MASS INDEX: 27.3 KG/M2 | HEART RATE: 72 BPM | DIASTOLIC BLOOD PRESSURE: 80 MMHG | RESPIRATION RATE: 16 BRPM | OXYGEN SATURATION: 98 % | WEIGHT: 195 LBS

## 2024-05-20 DIAGNOSIS — S66.911A SPRAIN AND STRAIN OF RIGHT WRIST: ICD-10-CM

## 2024-05-20 DIAGNOSIS — S63.501A SPRAIN AND STRAIN OF RIGHT WRIST: ICD-10-CM

## 2024-05-20 PROCEDURE — 3079F DIAST BP 80-89 MM HG: CPT | Performed by: PHYSICIAN ASSISTANT

## 2024-05-20 PROCEDURE — 99213 OFFICE O/P EST LOW 20 MIN: CPT | Performed by: PHYSICIAN ASSISTANT

## 2024-05-20 PROCEDURE — 3074F SYST BP LT 130 MM HG: CPT | Performed by: PHYSICIAN ASSISTANT

## 2024-05-20 NOTE — PROGRESS NOTES
"  Subjective:   Chu Moralez is a 28 y.o. male who presents today with   Chief Complaint   Patient presents with    Wrist Injury     WC f.u      Wrist Injury         Patient presents today for follow-up of work injury.  Patient states he feels like he is back to 90 to 95% of normal.  Has not been going back to work as they do not have light duty.    PMH:  has no past medical history on file.  MEDS: No current outpatient medications on file.  ALLERGIES: No Known Allergies  SURGHX: History reviewed. No pertinent surgical history.  SOCHX:    FH: Reviewed with patient, not pertinent to this visit.       Review of Systems   Musculoskeletal:         Right wrist pain improving        Objective:   /80   Pulse 72   Temp 36 °C (96.8 °F) (Temporal)   Resp 16   Ht 1.803 m (5' 11\")   Wt 88.5 kg (195 lb)   SpO2 98%   BMI 27.20 kg/m²   Physical Exam  Vitals and nursing note reviewed.   Constitutional:       General: He is not in acute distress.     Appearance: Normal appearance. He is well-developed. He is not ill-appearing or toxic-appearing.   HENT:      Head: Normocephalic and atraumatic.      Right Ear: Hearing normal.      Left Ear: Hearing normal.   Cardiovascular:      Rate and Rhythm: Normal rate.   Pulmonary:      Effort: Pulmonary effort is normal.   Musculoskeletal:      Comments: Patient with no bony tenderness to palpation of the right wrist.  No swelling or bruising noted.  Neurovascular intact distally.  Less than 2 capillary fill.  Full range of motion and no pain.  No point tenderness.     Skin:     General: Skin is warm and dry.   Neurological:      Mental Status: He is alert.      Coordination: Coordination normal.   Psychiatric:         Mood and Affect: Mood normal.       Assessment/Plan:   Assessment    1. Sprain and strain of right wrist    Symptoms and presentation appear to be continuing to improve as patient states he is back to 95% to normal.  He would like to continue with light duty " for another week to rest the area as he is still having some pain with certain movements and use of the hand.  Will trial full duty or be discharged at next visit.  Please see scanned [id number].  Please note that this dictation was created using voice recognition software. I have made every reasonable attempt to correct obvious errors, but I expect that there may be errors of grammar and possibly content that I did not discover before finalizing the note.         Please note that this dictation was created using voice recognition software. I have made every reasonable attempt to correct obvious errors, but I expect that there are errors of grammar and possibly content that I did not discover before finalizing the note.    Jayden Schneider PA-C

## 2024-05-26 ENCOUNTER — OCCUPATIONAL MEDICINE (OUTPATIENT)
Dept: URGENT CARE | Facility: CLINIC | Age: 29
End: 2024-05-26
Payer: COMMERCIAL

## 2024-05-26 VITALS
OXYGEN SATURATION: 99 % | SYSTOLIC BLOOD PRESSURE: 120 MMHG | WEIGHT: 195 LBS | TEMPERATURE: 98 F | DIASTOLIC BLOOD PRESSURE: 76 MMHG | HEART RATE: 72 BPM | RESPIRATION RATE: 16 BRPM | BODY MASS INDEX: 27.3 KG/M2 | HEIGHT: 71 IN

## 2024-05-26 DIAGNOSIS — S63.501D SPRAIN OF RIGHT WRIST, SUBSEQUENT ENCOUNTER: ICD-10-CM

## 2024-05-26 PROCEDURE — 3078F DIAST BP <80 MM HG: CPT | Performed by: STUDENT IN AN ORGANIZED HEALTH CARE EDUCATION/TRAINING PROGRAM

## 2024-05-26 PROCEDURE — 3074F SYST BP LT 130 MM HG: CPT | Performed by: STUDENT IN AN ORGANIZED HEALTH CARE EDUCATION/TRAINING PROGRAM

## 2024-05-26 PROCEDURE — 99213 OFFICE O/P EST LOW 20 MIN: CPT | Performed by: STUDENT IN AN ORGANIZED HEALTH CARE EDUCATION/TRAINING PROGRAM

## 2024-05-26 NOTE — PROGRESS NOTES
"Subjective:   Chu Moralez is a 28 y.o. male who presents for Follow-Up (WC FV, R wrist, feeling better)      HPI:  28-year-old male presents to urgent care for work-related injury follow-up of the right wrist.  States he feels at 98% of normal.  Not experiencing continued pain.  Has had improvement since last visit.  No numbness, tingling, or burning.  Reports full range of motion.    Medications:    This patient does not have an active medication from one of the medication groupers.    Allergies: Patient has no known allergies.    Problem List: Chu Moralez does not have a problem list on file.    Surgical History:  No past surgical history on file.    Past Social Hx: Chu Moralez       Past Family Hx:  Chu Moralez family history is not on file.     Problem list, medications, and allergies reviewed by myself today in Epic.     Objective:     /76   Pulse 72   Temp 36.7 °C (98 °F)   Resp 16   Ht 1.803 m (5' 11\")   Wt 88.5 kg (195 lb)   SpO2 99%   BMI 27.20 kg/m²     Physical Exam  Musculoskeletal:      Comments: Right wrist: No swelling or erythema.  No TTP throughout the wrist.  Full range of motion.  5 out of 5 strength with  strength.         Assessment/Plan:     Diagnosis and associated orders:     1. Sprain of right wrist, subsequent encounter           Comments/MDM:     Patient's presentation physical exam findings consistent with healed right wrist sprain.  Not experiencing any abnormal findings on physical exam today.  Patient believes he is ready for full duty at this time.  Given physical exam findings actively.  Reasonable.  Patient be discharged at this time.  Return precautions given.         Differential diagnosis, natural history, supportive care, and indications for immediate follow-up discussed.    Advised the patient to follow-up with the primary care physician for recheck, reevaluation, and consideration of further management.    Please note that this " dictation was created using voice recognition software. I have made a reasonable attempt to correct obvious errors, but I expect that there are errors of grammar and possibly content that I did not discover before finalizing the note.    Electronically signed by Calixto Zaldivar PA-C.